# Patient Record
Sex: FEMALE | Race: WHITE | Employment: FULL TIME | URBAN - METROPOLITAN AREA
[De-identification: names, ages, dates, MRNs, and addresses within clinical notes are randomized per-mention and may not be internally consistent; named-entity substitution may affect disease eponyms.]

---

## 2017-03-04 ENCOUNTER — HOSPITAL ENCOUNTER (EMERGENCY)
Facility: HOSPITAL | Age: 57
Discharge: HOME | End: 2017-03-04
Attending: EMERGENCY MEDICINE
Payer: COMMERCIAL

## 2017-03-04 ENCOUNTER — APPOINTMENT (OUTPATIENT)
Dept: CT IMAGING | Facility: HOSPITAL | Age: 57
End: 2017-03-04
Attending: EMERGENCY MEDICINE
Payer: COMMERCIAL

## 2017-03-04 VITALS
RESPIRATION RATE: 16 BRPM | OXYGEN SATURATION: 98 % | SYSTOLIC BLOOD PRESSURE: 126 MMHG | HEIGHT: 64 IN | WEIGHT: 132 LBS | DIASTOLIC BLOOD PRESSURE: 69 MMHG | BODY MASS INDEX: 22.53 KG/M2 | HEART RATE: 76 BPM | TEMPERATURE: 97 F

## 2017-03-04 DIAGNOSIS — M54.50 BACK PAIN, LUMBOSACRAL: Primary | ICD-10-CM

## 2017-03-04 LAB
ALBUMIN SERPL-MCNC: 3.7 G/DL (ref 3.5–4.8)
ALP LIVER SERPL-CCNC: 82 U/L (ref 41–108)
ALT SERPL-CCNC: 17 U/L (ref 14–54)
AST SERPL-CCNC: 11 U/L (ref 15–41)
BASOPHILS # BLD AUTO: 0.04 X10(3) UL (ref 0–0.1)
BASOPHILS NFR BLD AUTO: 0.5 %
BILIRUB SERPL-MCNC: 0.4 MG/DL (ref 0.1–2)
BILIRUB UR QL STRIP.AUTO: NEGATIVE
BUN BLD-MCNC: 13 MG/DL (ref 8–20)
CALCIUM BLD-MCNC: 9.9 MG/DL (ref 8.3–10.3)
CHLORIDE: 106 MMOL/L (ref 101–111)
CLARITY UR REFRACT.AUTO: CLEAR
CO2: 28 MMOL/L (ref 22–32)
COLOR UR AUTO: YELLOW
CREAT BLD-MCNC: 0.74 MG/DL (ref 0.55–1.02)
EOSINOPHIL # BLD AUTO: 0.07 X10(3) UL (ref 0–0.3)
EOSINOPHIL NFR BLD AUTO: 0.8 %
ERYTHROCYTE [DISTWIDTH] IN BLOOD BY AUTOMATED COUNT: 11.2 % (ref 11.5–16)
GLUCOSE BLD-MCNC: 98 MG/DL (ref 70–99)
GLUCOSE UR STRIP.AUTO-MCNC: NEGATIVE MG/DL
HCT VFR BLD AUTO: 39.2 % (ref 34–50)
HGB BLD-MCNC: 13.4 G/DL (ref 12–16)
IMMATURE GRANULOCYTE COUNT: 0.02 X10(3) UL (ref 0–1)
IMMATURE GRANULOCYTE RATIO %: 0.2 %
KETONES UR STRIP.AUTO-MCNC: NEGATIVE MG/DL
LIPASE: 132 U/L (ref 73–393)
LYMPHOCYTES # BLD AUTO: 1.69 X10(3) UL (ref 0.9–4)
LYMPHOCYTES NFR BLD AUTO: 20.2 %
M PROTEIN MFR SERPL ELPH: 8.2 G/DL (ref 6.1–8.3)
MCH RBC QN AUTO: 30.2 PG (ref 27–33.2)
MCHC RBC AUTO-ENTMCNC: 34.2 G/DL (ref 31–37)
MCV RBC AUTO: 88.3 FL (ref 81–100)
MONOCYTES # BLD AUTO: 0.63 X10(3) UL (ref 0.1–0.6)
MONOCYTES NFR BLD AUTO: 7.5 %
NEUTROPHIL ABS PRELIM: 5.9 X10 (3) UL (ref 1.3–6.7)
NEUTROPHILS # BLD AUTO: 5.9 X10(3) UL (ref 1.3–6.7)
NEUTROPHILS NFR BLD AUTO: 70.8 %
NITRITE UR QL STRIP.AUTO: NEGATIVE
PH UR STRIP.AUTO: 7 [PH] (ref 4.5–8)
PLATELET # BLD AUTO: 340 10(3)UL (ref 150–450)
POTASSIUM SERPL-SCNC: 3.7 MMOL/L (ref 3.6–5.1)
PROT UR STRIP.AUTO-MCNC: NEGATIVE MG/DL
RBC # BLD AUTO: 4.44 X10(6)UL (ref 3.8–5.1)
RED CELL DISTRIBUTION WIDTH-SD: 35.8 FL (ref 35.1–46.3)
SODIUM SERPL-SCNC: 141 MMOL/L (ref 136–144)
SP GR UR STRIP.AUTO: 1.01 (ref 1–1.03)
UROBILINOGEN UR STRIP.AUTO-MCNC: <2 MG/DL
WBC # BLD AUTO: 8.4 X10(3) UL (ref 4–13)

## 2017-03-04 PROCEDURE — 81001 URINALYSIS AUTO W/SCOPE: CPT | Performed by: EMERGENCY MEDICINE

## 2017-03-04 PROCEDURE — 80053 COMPREHEN METABOLIC PANEL: CPT | Performed by: EMERGENCY MEDICINE

## 2017-03-04 PROCEDURE — 99284 EMERGENCY DEPT VISIT MOD MDM: CPT

## 2017-03-04 PROCEDURE — 85025 COMPLETE CBC W/AUTO DIFF WBC: CPT | Performed by: EMERGENCY MEDICINE

## 2017-03-04 PROCEDURE — 83690 ASSAY OF LIPASE: CPT | Performed by: EMERGENCY MEDICINE

## 2017-03-04 PROCEDURE — 87086 URINE CULTURE/COLONY COUNT: CPT | Performed by: EMERGENCY MEDICINE

## 2017-03-04 PROCEDURE — 74176 CT ABD & PELVIS W/O CONTRAST: CPT

## 2017-03-04 PROCEDURE — 36415 COLL VENOUS BLD VENIPUNCTURE: CPT

## 2017-03-04 RX ORDER — HYDROCODONE BITARTRATE AND ACETAMINOPHEN 5; 325 MG/1; MG/1
1-2 TABLET ORAL EVERY 4 HOURS PRN
Qty: 20 TABLET | Refills: 0 | Status: SHIPPED | OUTPATIENT
Start: 2017-03-04 | End: 2017-03-11

## 2017-03-04 NOTE — ED INITIAL ASSESSMENT (HPI)
Pt to ed from home with c/o pain to back and r side flank area, some constipation noted as well, sx present last 3 days.

## 2017-03-04 NOTE — ED PROVIDER NOTES
Patient Seen in: [facility] Emergency Department    History   Patient presents with:  Back Pain (musculoskeletal)    Stated Complaint: LOWER BACK PAIN    HPI    56-year-old white female who presents to the emergency room today for complaint of right daily. Fluticasone Propionate (FLONASE) 50 MCG/ACT Nasal Suspension,  2 sprays by Nasal route daily. ValACYclovir HCl (VALTREX) 1 G Oral Tab,  2 tabs now repeat in 12 hours  Patient taking differently: as needed.  2 tabs now repeat in 12 hours    Esomep Current:/69 mmHg  Pulse 76  Temp(Src) 97.4 °F (36.3 °C) (Temporal)  Resp 16  Ht 162.6 cm (5' 4\")  Wt 59.875 kg  BMI 22.65 kg/m2  SpO2 98%  LMP 01/21/2009        Physical Exam    Well-developed well-nourished female who is sitting on the gurney CBC W/ DIFFERENTIAL[[id number]]          Abnormal            Final result                 Please view results for these tests on the individual orders.    URINE CULTURE, ROUTINE    CT scan of the abdomen pelvis reveals:    FINDINGS:    KIDNEYS:  Nono Clinical Impression:  Back pain, lumbosacral  (primary encounter diagnosis)    Disposition:  Discharge    Follow-up:  Yossi Gracia, [address]  [phone]    In 2 days        Medications Prescribed:  [place]

## 2017-03-04 NOTE — ED NOTES
Round on pt. No distress noted. Pt updated on eta to CT. Denies any needs at this time.   Denies need for pain meds

## 2017-03-04 NOTE — ED NOTES
Pt present to ed from home with c/o r side back and flank pain, pt sx present off and on for 3 days, pt having some constipation, denies n/v.

## 2017-03-04 NOTE — ED NOTES
DC instructions and RX handed to pt. Pt denies any further needs at this time. No distress noted. Pt thanks staff for care. Family at bedside.

## 2017-07-31 PROCEDURE — 88175 CYTOPATH C/V AUTO FLUID REDO: CPT | Performed by: OBSTETRICS & GYNECOLOGY

## 2017-07-31 PROCEDURE — 87624 HPV HI-RISK TYP POOLED RSLT: CPT | Performed by: OBSTETRICS & GYNECOLOGY

## 2018-03-20 PROCEDURE — 88305 TISSUE EXAM BY PATHOLOGIST: CPT | Performed by: RADIOLOGY

## 2019-11-03 PROBLEM — M75.81 TENDINITIS OF RIGHT ROTATOR CUFF: Status: ACTIVE | Noted: 2019-11-03

## 2020-07-01 PROBLEM — H10.13 ALLERGIC CONJUNCTIVITIS OF BOTH EYES: Status: ACTIVE | Noted: 2019-09-16

## 2021-06-18 PROCEDURE — 88300 SURGICAL PATH GROSS: CPT | Performed by: UROLOGY

## 2021-06-19 ENCOUNTER — APPOINTMENT (OUTPATIENT)
Dept: GENERAL RADIOLOGY | Facility: HOSPITAL | Age: 61
DRG: 661 | End: 2021-06-19
Attending: UROLOGY
Payer: COMMERCIAL

## 2021-06-19 ENCOUNTER — APPOINTMENT (OUTPATIENT)
Dept: ULTRASOUND IMAGING | Facility: HOSPITAL | Age: 61
DRG: 661 | End: 2021-06-19
Attending: EMERGENCY MEDICINE
Payer: COMMERCIAL

## 2021-06-19 ENCOUNTER — HOSPITAL ENCOUNTER (INPATIENT)
Facility: HOSPITAL | Age: 61
LOS: 1 days | Discharge: HOME | DRG: 661 | End: 2021-06-20
Attending: EMERGENCY MEDICINE | Admitting: HOSPITALIST
Payer: COMMERCIAL

## 2021-06-19 ENCOUNTER — ANESTHESIA (OUTPATIENT)
Dept: SURGERY | Facility: HOSPITAL | Age: 61
DRG: 661 | End: 2021-06-19
Payer: COMMERCIAL

## 2021-06-19 ENCOUNTER — ANESTHESIA EVENT (OUTPATIENT)
Dept: SURGERY | Facility: HOSPITAL | Age: 61
DRG: 661 | End: 2021-06-19
Payer: COMMERCIAL

## 2021-06-19 DIAGNOSIS — N13.5 URETERAL STRICTURE, RIGHT: ICD-10-CM

## 2021-06-19 DIAGNOSIS — N13.30 HYDRONEPHROSIS, UNSPECIFIED HYDRONEPHROSIS TYPE: ICD-10-CM

## 2021-06-19 DIAGNOSIS — R10.9 ABDOMINAL PAIN, ACUTE: Primary | ICD-10-CM

## 2021-06-19 PROBLEM — R73.9 HYPERGLYCEMIA: Status: ACTIVE | Noted: 2021-06-19

## 2021-06-19 PROBLEM — R79.89 AZOTEMIA: Status: ACTIVE | Noted: 2021-06-19

## 2021-06-19 PROCEDURE — 80053 COMPREHEN METABOLIC PANEL: CPT | Performed by: EMERGENCY MEDICINE

## 2021-06-19 PROCEDURE — 0T778DZ DILATION OF LEFT URETER WITH INTRALUMINAL DEVICE, VIA NATURAL OR ARTIFICIAL OPENING ENDOSCOPIC: ICD-10-PCS | Performed by: UROLOGY

## 2021-06-19 PROCEDURE — 80074 ACUTE HEPATITIS PANEL: CPT | Performed by: HOSPITALIST

## 2021-06-19 PROCEDURE — BT1FZZZ FLUOROSCOPY OF LEFT KIDNEY, URETER AND BLADDER: ICD-10-PCS | Performed by: UROLOGY

## 2021-06-19 PROCEDURE — 76700 US EXAM ABDOM COMPLETE: CPT | Performed by: EMERGENCY MEDICINE

## 2021-06-19 PROCEDURE — 82365 CALCULUS SPECTROSCOPY: CPT | Performed by: UROLOGY

## 2021-06-19 PROCEDURE — 81003 URINALYSIS AUTO W/O SCOPE: CPT | Performed by: EMERGENCY MEDICINE

## 2021-06-19 PROCEDURE — 93005 ELECTROCARDIOGRAM TRACING: CPT

## 2021-06-19 PROCEDURE — 96376 TX/PRO/DX INJ SAME DRUG ADON: CPT

## 2021-06-19 PROCEDURE — 99285 EMERGENCY DEPT VISIT HI MDM: CPT

## 2021-06-19 PROCEDURE — 96361 HYDRATE IV INFUSION ADD-ON: CPT

## 2021-06-19 PROCEDURE — 93010 ELECTROCARDIOGRAM REPORT: CPT

## 2021-06-19 PROCEDURE — 96374 THER/PROPH/DIAG INJ IV PUSH: CPT

## 2021-06-19 PROCEDURE — 96375 TX/PRO/DX INJ NEW DRUG ADDON: CPT

## 2021-06-19 PROCEDURE — 0TC78ZZ EXTIRPATION OF MATTER FROM LEFT URETER, VIA NATURAL OR ARTIFICIAL OPENING ENDOSCOPIC: ICD-10-PCS | Performed by: UROLOGY

## 2021-06-19 PROCEDURE — 85025 COMPLETE CBC W/AUTO DIFF WBC: CPT | Performed by: EMERGENCY MEDICINE

## 2021-06-19 DEVICE — URETERAL STENT
Type: IMPLANTABLE DEVICE | Site: URETER | Status: FUNCTIONAL
Brand: ASCERTA™

## 2021-06-19 RX ORDER — HYDROMORPHONE HYDROCHLORIDE 1 MG/ML
0.4 INJECTION, SOLUTION INTRAMUSCULAR; INTRAVENOUS; SUBCUTANEOUS EVERY 5 MIN PRN
Status: DISCONTINUED | OUTPATIENT
Start: 2021-06-19 | End: 2021-06-19 | Stop reason: HOSPADM

## 2021-06-19 RX ORDER — ONDANSETRON 2 MG/ML
4 INJECTION INTRAMUSCULAR; INTRAVENOUS AS NEEDED
Status: DISCONTINUED | OUTPATIENT
Start: 2021-06-19 | End: 2021-06-19 | Stop reason: HOSPADM

## 2021-06-19 RX ORDER — ONDANSETRON 2 MG/ML
4 INJECTION INTRAMUSCULAR; INTRAVENOUS EVERY 4 HOURS PRN
Status: DISPENSED | OUTPATIENT
Start: 2021-06-19 | End: 2021-06-19

## 2021-06-19 RX ORDER — ONDANSETRON 2 MG/ML
INJECTION INTRAMUSCULAR; INTRAVENOUS AS NEEDED
Status: DISCONTINUED | OUTPATIENT
Start: 2021-06-19 | End: 2021-06-19 | Stop reason: SURG

## 2021-06-19 RX ORDER — MORPHINE SULFATE 4 MG/ML
4 INJECTION, SOLUTION INTRAMUSCULAR; INTRAVENOUS EVERY 2 HOUR PRN
Status: DISCONTINUED | OUTPATIENT
Start: 2021-06-19 | End: 2021-06-20

## 2021-06-19 RX ORDER — CEPHALEXIN 500 MG/1
500 CAPSULE ORAL 3 TIMES DAILY
Qty: 6 CAPSULE | Refills: 0 | Status: SHIPPED | OUTPATIENT
Start: 2021-06-19 | End: 2021-06-21

## 2021-06-19 RX ORDER — DIPHENHYDRAMINE HYDROCHLORIDE 50 MG/ML
12.5 INJECTION INTRAMUSCULAR; INTRAVENOUS AS NEEDED
Status: DISCONTINUED | OUTPATIENT
Start: 2021-06-19 | End: 2021-06-19 | Stop reason: HOSPADM

## 2021-06-19 RX ORDER — SODIUM CHLORIDE 9 MG/ML
INJECTION, SOLUTION INTRAVENOUS CONTINUOUS
Status: DISCONTINUED | OUTPATIENT
Start: 2021-06-19 | End: 2021-06-20

## 2021-06-19 RX ORDER — HYDROMORPHONE HYDROCHLORIDE 1 MG/ML
0.5 INJECTION, SOLUTION INTRAMUSCULAR; INTRAVENOUS; SUBCUTANEOUS EVERY 30 MIN PRN
Status: ACTIVE | OUTPATIENT
Start: 2021-06-19 | End: 2021-06-19

## 2021-06-19 RX ORDER — HYDROMORPHONE HYDROCHLORIDE 1 MG/ML
1 INJECTION, SOLUTION INTRAMUSCULAR; INTRAVENOUS; SUBCUTANEOUS ONCE
Status: COMPLETED | OUTPATIENT
Start: 2021-06-19 | End: 2021-06-19

## 2021-06-19 RX ORDER — CEFAZOLIN SODIUM 1 G/3ML
INJECTION, POWDER, FOR SOLUTION INTRAMUSCULAR; INTRAVENOUS AS NEEDED
Status: DISCONTINUED | OUTPATIENT
Start: 2021-06-19 | End: 2021-06-19 | Stop reason: SURG

## 2021-06-19 RX ORDER — ONDANSETRON 2 MG/ML
4 INJECTION INTRAMUSCULAR; INTRAVENOUS EVERY 6 HOURS PRN
Status: DISCONTINUED | OUTPATIENT
Start: 2021-06-19 | End: 2021-06-20

## 2021-06-19 RX ORDER — METOCLOPRAMIDE HYDROCHLORIDE 5 MG/ML
INJECTION INTRAMUSCULAR; INTRAVENOUS AS NEEDED
Status: DISCONTINUED | OUTPATIENT
Start: 2021-06-19 | End: 2021-06-19 | Stop reason: SURG

## 2021-06-19 RX ORDER — MORPHINE SULFATE 2 MG/ML
1 INJECTION, SOLUTION INTRAMUSCULAR; INTRAVENOUS EVERY 2 HOUR PRN
Status: DISCONTINUED | OUTPATIENT
Start: 2021-06-19 | End: 2021-06-20

## 2021-06-19 RX ORDER — SODIUM CHLORIDE, SODIUM LACTATE, POTASSIUM CHLORIDE, CALCIUM CHLORIDE 600; 310; 30; 20 MG/100ML; MG/100ML; MG/100ML; MG/100ML
INJECTION, SOLUTION INTRAVENOUS CONTINUOUS
Status: DISCONTINUED | OUTPATIENT
Start: 2021-06-19 | End: 2021-06-19 | Stop reason: HOSPADM

## 2021-06-19 RX ORDER — NALOXONE HYDROCHLORIDE 0.4 MG/ML
80 INJECTION, SOLUTION INTRAMUSCULAR; INTRAVENOUS; SUBCUTANEOUS AS NEEDED
Status: DISCONTINUED | OUTPATIENT
Start: 2021-06-19 | End: 2021-06-19 | Stop reason: HOSPADM

## 2021-06-19 RX ORDER — MEPERIDINE HYDROCHLORIDE 25 MG/ML
12.5 INJECTION INTRAMUSCULAR; INTRAVENOUS; SUBCUTANEOUS AS NEEDED
Status: DISCONTINUED | OUTPATIENT
Start: 2021-06-19 | End: 2021-06-19 | Stop reason: HOSPADM

## 2021-06-19 RX ORDER — HYDROMORPHONE HYDROCHLORIDE 1 MG/ML
INJECTION, SOLUTION INTRAMUSCULAR; INTRAVENOUS; SUBCUTANEOUS
Status: COMPLETED
Start: 2021-06-19 | End: 2021-06-19

## 2021-06-19 RX ORDER — SODIUM CHLORIDE 9 MG/ML
INJECTION, SOLUTION INTRAVENOUS CONTINUOUS
Status: ACTIVE | OUTPATIENT
Start: 2021-06-19 | End: 2021-06-19

## 2021-06-19 RX ORDER — KETOROLAC TROMETHAMINE 30 MG/ML
15 INJECTION, SOLUTION INTRAMUSCULAR; INTRAVENOUS ONCE
Status: COMPLETED | OUTPATIENT
Start: 2021-06-19 | End: 2021-06-19

## 2021-06-19 RX ORDER — LIDOCAINE HYDROCHLORIDE 10 MG/ML
INJECTION, SOLUTION EPIDURAL; INFILTRATION; INTRACAUDAL; PERINEURAL AS NEEDED
Status: DISCONTINUED | OUTPATIENT
Start: 2021-06-19 | End: 2021-06-19 | Stop reason: SURG

## 2021-06-19 RX ORDER — LABETALOL HYDROCHLORIDE 5 MG/ML
5 INJECTION, SOLUTION INTRAVENOUS EVERY 5 MIN PRN
Status: DISCONTINUED | OUTPATIENT
Start: 2021-06-19 | End: 2021-06-19 | Stop reason: HOSPADM

## 2021-06-19 RX ORDER — MORPHINE SULFATE 2 MG/ML
2 INJECTION, SOLUTION INTRAMUSCULAR; INTRAVENOUS EVERY 2 HOUR PRN
Status: DISCONTINUED | OUTPATIENT
Start: 2021-06-19 | End: 2021-06-20

## 2021-06-19 RX ORDER — METOCLOPRAMIDE HYDROCHLORIDE 5 MG/ML
10 INJECTION INTRAMUSCULAR; INTRAVENOUS AS NEEDED
Status: DISCONTINUED | OUTPATIENT
Start: 2021-06-19 | End: 2021-06-19 | Stop reason: HOSPADM

## 2021-06-19 RX ORDER — KETOROLAC TROMETHAMINE 15 MG/ML
15 INJECTION, SOLUTION INTRAMUSCULAR; INTRAVENOUS ONCE
Status: COMPLETED | OUTPATIENT
Start: 2021-06-19 | End: 2021-06-19

## 2021-06-19 RX ORDER — PROCHLORPERAZINE EDISYLATE 5 MG/ML
5 INJECTION INTRAMUSCULAR; INTRAVENOUS EVERY 8 HOURS PRN
Status: DISCONTINUED | OUTPATIENT
Start: 2021-06-19 | End: 2021-06-20

## 2021-06-19 RX ADMIN — SODIUM CHLORIDE: 9 INJECTION, SOLUTION INTRAVENOUS at 16:03:00

## 2021-06-19 RX ADMIN — CEFAZOLIN SODIUM 2 G: 1 INJECTION, POWDER, FOR SOLUTION INTRAMUSCULAR; INTRAVENOUS at 16:15:00

## 2021-06-19 RX ADMIN — METOCLOPRAMIDE HYDROCHLORIDE 10 MG: 5 INJECTION INTRAMUSCULAR; INTRAVENOUS at 16:09:00

## 2021-06-19 RX ADMIN — LIDOCAINE HYDROCHLORIDE 50 MG: 10 INJECTION, SOLUTION EPIDURAL; INFILTRATION; INTRACAUDAL; PERINEURAL at 16:09:00

## 2021-06-19 RX ADMIN — ONDANSETRON 4 MG: 2 INJECTION INTRAMUSCULAR; INTRAVENOUS at 16:22:00

## 2021-06-19 NOTE — PROGRESS NOTES
I spoke to the ER physician and the patient by phone. CT scan images suggest a UPJ obstruction. Looks like a benign process to me. I spoke with her on the phone about cystoscopy, right retrograde pyelogram, ureteroscopy and placement of ureteral stent.

## 2021-06-19 NOTE — ED INITIAL ASSESSMENT (HPI)
Patient here with c/o right flank pain. Patient was seen at Thursday and was told she has a upj obstruction. Patient reports feeling a little nauseated. Denies diarrhea and fever.

## 2021-06-19 NOTE — ANESTHESIA PROCEDURE NOTES
Airway  Date/Time: 6/19/2021 4:09 PM  Urgency: elective      General Information and Staff    Patient location during procedure: OR  Anesthesiologist: Chelsie Peng MD    Indications and Patient Condition  Indications for airway management: anesthesia

## 2021-06-19 NOTE — ED PROVIDER NOTES
Patient Seen in: [facility] Emergency Department      History   Patient presents with:  Abdomen/Flank Pain    Stated Complaint: right abdominal pain, seen at IC yesterday.  dx with upj obstruction     HPI/Subjective:   HPI    This is a 62-year-old fe colon appears normal. PELVIS: No mass is seen. MESENTERY/RETROPERITONEUM: There is no evidence of mesenteric, retroperitoneal or inguinal adenopathy. PERITONEUM: There is no free air or significant free fluid.  OSSEOUS STRUCTURES: The visualized osseous str Physical Exam  General: . Patient is in some moderate discomfort secondary to pain. There is no CVA tenderness the patient is mild to moderately tender in the right side of her abdomen.   Mild to moderately tender on the right side of the abdomen DRAW GOLD   RAPID SARS-COV-2 BY PCR   CBC W/ DIFFERENTIAL          The patient was placed on monitors, IV was started, blood was drawn. MDM            The EKG shows normal sinus rhythm. There is no acute ST elevations or ischemic findings.   The re performed from the lung bases through the pubic symphysis without the use of intravenous or oral contrast.  Note some limitations of the examination due to lack of intravenous contrast. Automated exposure control and ALARA manual techniques for patient spe patient started having increasing pain and discomfort patient had to give multiple doses of Dilaudid.   I did go back and reexamined and the pain is not reproducible on pressing the abdomen but does seem to be that is intermittent sporadic in nature because

## 2021-06-19 NOTE — H&P
[facility] Hospitalist H&P       CC: Patient presents with:  Abdomen/Flank Pain       PCP: Patti Robison DO    History of Present Illness: 63 y/o w hx of gerd, htn p/w c/o right sided flank pain, was diagnosed w right UPJ obstrcution on ct abd 6/17, ultrasound daily.  , Disp: , Rfl:   Fexofenadine HCl (ALLEGRA OR), as needed. , Disp: , Rfl:           Soc Hx  Social History    Tobacco Use      Smoking status: Never Smoker      Smokeless tobacco: Never Used    Alcohol use:  Yes      Alcohol/week: 0.0 standard drin focal deficit appreciated     Diagnostic Data:    CBC/Chem  Recent Labs   Lab 06/17/21  1638 06/19/21  0638   WBC 11.25 10.1   HGB 13.3 13.3   MCV 90.9 90.4    330.0       Recent Labs   Lab 06/17/21  1638 06/19/21  0638    138   K 3.89 3.6   C outlined    Thank Evon Brown MD    [facility] Hospitalist  Answering Service number: [phone]

## 2021-06-19 NOTE — OPERATIVE REPORT
OPERATIVE REPORT    [address] Patient Status:  Inpatient    1960 MRN [id number]   Location [address] [facility] Attending Callum Alcala MD   Hosp Day # 0 PCP Marleni Callahan DO       DATE OF OPERATION: 6 and the bladder was  examined. The ureteral orifices were in normal position. There was some fullness of the intramural ureter just proximal to the ureteral orifice.   A cone-tip catheter was passed into the right ureteral orifice and water-soluble contras

## 2021-06-19 NOTE — ANESTHESIA PREPROCEDURE EVALUATION
PRE-OP EVALUATION    Patient Name: Neetu Robbins    Admit Diagnosis: Abdominal pain, acute [R10.9]  Hydronephrosis, unspecified hydronephrosis type [N13.30]    Pre-op Diagnosis: Ureteral stricture, right [N13.5]    CYSTOSCOPY,  RETROGRADE , PYELOGRAM,  U (DIPRIVAN) injection, , Intravenous, PRN  lidocaine PF (XYLOCAINE) 1% injection, , Injection, PRN  fentaNYL citrate (SUBLIMAZE) 0.05 MG/ML injection, , Intravenous, PRN  Metoclopramide HCl (REGLAN) injection, , Intravenous, PRN  ondansetron HCl (ZOFRAN) in POSSIBLE BIOPSY, POSSIBLE POLYPECTOMY 17795;  Surgeon:  Tahir Warren MD;  Location: [address] [place] [address]   • D & C  04/17/14   • HYSTEROSCOPY  04/17/14   • LAPAROSCOPIC CHOLECYSTECTOMY N/A 12/26/2016    Procedure: Cesar Owens Other findings            ASA: 2   Plan: general  NPO status verified and patient meets guidelines. Patient has taken beta blockers in last 24 hours.         Plan/risks discussed with: patient            We discussed GA w/LMA or ETT and possible scratchy

## 2021-06-19 NOTE — PLAN OF CARE
Spoke to Dr. Tegan Small regarding possible discharge tonight per Urology. No discharge tonight, monitor pt overnight and check labs in am per Dr. Tegan Small.

## 2021-06-19 NOTE — PLAN OF CARE
Transport here to take pt to surgery. OR consent signed on chart. Anesthesia consent to be signed. Pre-op checklist completed. Pt has been NPO all day. Voided in bathroom.  Right sided abdominal pain with intermittent severity up to 8/10, moderately control

## 2021-06-19 NOTE — ANESTHESIA POSTPROCEDURE EVALUATION
[address] Patient Status:  Inpatient   Age/Gender 62year old female MRN [id number]   Location [address] [facility] Attending Johnson Meneses MD   [facility] Day # 0 PCP Amrit Hoffmann DO       Anesthesia Post-op Note

## 2021-06-19 NOTE — ANESTHESIA PROCEDURE NOTES
Peripheral IV  Date/Time: 6/19/2021 4:14 PM  Inserted by: Jennifer Stewart MD    Placement  Needle size: 18 G  Laterality: left  Location: hand  Site prep: alcohol  Technique: anatomical landmarks  Attempts: 1

## 2021-06-20 VITALS
HEIGHT: 64 IN | SYSTOLIC BLOOD PRESSURE: 119 MMHG | HEART RATE: 72 BPM | OXYGEN SATURATION: 97 % | DIASTOLIC BLOOD PRESSURE: 59 MMHG | RESPIRATION RATE: 14 BRPM | TEMPERATURE: 98 F | WEIGHT: 140 LBS | BODY MASS INDEX: 23.9 KG/M2

## 2021-06-20 PROCEDURE — 85027 COMPLETE CBC AUTOMATED: CPT | Performed by: UROLOGY

## 2021-06-20 PROCEDURE — 80053 COMPREHEN METABOLIC PANEL: CPT | Performed by: UROLOGY

## 2021-06-20 NOTE — PLAN OF CARE
Patient alert and oriented x4, vital signs stable on RA. Mild abdominal discomfort expressed early in evening, now resolves and no reports of pain. Patient reports burning with urination upon initiation of stream but then subsides.  Scant blood present when

## 2021-06-20 NOTE — DISCHARGE SUMMARY
General Medicine Discharge Summary     Patient ID:  Home Sethi  62year old  6/12/1960    Admit date: 6/19/2021    Discharge date and time: 6/20/21  Attending Physician: Mathew Mars MD     Consults: IP CONSULT TO UROLOGY  IP CONSULT TO HOSPITALIST the right ureteropelvic junction, suggesting right UPJ obstruction. No nephrolithiasis is seen. XR OR - N/C    Result Date: 6/20/2021  CONCLUSION:  Please see the procedure/operative report for further details.      Dictated by (CST): Jonathan Hayward, experience with your operation. These instructions will help to minimize pain and improve the likelihood of a successful result.     You Should Expect:  · Bloody urine until the stent is removed  · Burning with urination until the stent is removed    Care your home medications as instructed    Diet  · Resume your normal diet    Activity  Resume normal activity.     Driving  Do not drive for 24 hours    Follow-up Appointment with Dr. Irish Leung  · Call Dr. Greg Stewart office tomorrow for an appointment in 2 to 4 weeks f

## 2021-06-20 NOTE — PLAN OF CARE
Returned to room after PACU at 1800. Alert and oriented x4. VSS. Ambulated to bathroom and voided karlo urine with some blood. C/o burning with urination and mild abdomen discomfort. Declined pain medication. IVF infusing, will continue IVF overnight.  Skip

## 2021-06-20 NOTE — PLAN OF CARE
NURSING DISCHARGE NOTE    Discharged Home via Wheelchair. Accompanied by Spouse  Belongings Taken by patient/family. AVS printed and discussed, IV removed, reminded to  Rx at pharmacy. Pt ready to discharge home.

## 2021-06-20 NOTE — PLAN OF CARE
POD 1 Cystoscopy, Pt is AAOX4, VSS, room air, normal diet, no N/V noted, voiding freely to restroom, no new diana blood noted in urine, Pt doing well, ready for discharge home, all needs met, all safety measures in place, call light within reach, will CTM.

## 2021-06-21 NOTE — PAYOR COMM NOTE
--------------  ADMISSION REVIEW     Payor: JOSE BENJAMIN  Subscriber #:  [id number]  Authorization Number: [id number]       Admit date: 6/19/21  Admit time: 11:19 AM       Admitting Physician: Geno Marquez MD  Attending Physician:  No att. providers found  P were in place on the lower legs. The cystoscope was passed into the bladder and the bladder was  examined. The ureteral orifices were in normal position. There was some fullness of the intramural ureter just proximal to the ureteral orifice.   A cone-t 9:48 AM Date of Service: 6/19/2021  6:34 AM Status: Signed    : Tahir Solo MD (Physician)           Patient Seen in: [facility] Emergency Department      History   Patient presents with:  Abdomen/Flank Pain    Stated Complaint: right abdo surgically removed. BOWEL: The small bowel demonstrates normal caliber. The appendix has been surgically removed. The colon appears normal. PELVIS: No mass is seen.  MESENTERY/RETROPERITONEUM: There is no evidence of mesenteric, retroperitoneal or inguinal .  Patient is in some moderate discomfort secondary to pain. There is no CVA tenderness the patient is mild to moderately tender in the right side of her abdomen.   Mild to moderately tender on the right side of the abdomen  The patient is in no respirator PCR   CBC W/ DIFFERENTIAL          The patient was placed on monitors, IV was started, blood was drawn. MDM            The EKG shows normal sinus rhythm. There is no acute ST elevations or ischemic findings.   The rest of the EKG including rate rhyt the pubic symphysis without the use of intravenous or oral contrast.  Note some limitations of the examination due to lack of intravenous contrast. Automated exposure control and ALARA manual techniques for patient specific dose reduction were followed [facility] and discomfort patient had to give multiple doses of Dilaudid.   I did go back and reexamined and the pain is not reproducible on pressing the abdomen but does seem to be that is intermittent sporadic in nature because of the continued pain, the hydronephro motrin but did not help, thinks may have passed a kidney stone 5 years ago but unsure, no cp/sob    PMH  Past Medical History:   Diagnosis Date   • Arrhythmia     palpitations   • GERD EGD 2008   • HYPERTENSION    • Infertility female    • OSTEOPENIA    • obvious abnormality, atraumatic. Eyes:  Sclera anicteric, No conjunctival pallor, EOMs intact. Nose: Nares normal. Septum midline. Mucosa normal. No drainage.    Throat: Lips, mucosa, and tongue normal. Teeth and gums normal.   Neck: Supple, symmetrica obstruction. No nephrolithiasis is seen.          ASSESSMENT / PLAN:   63 y/o w hx of gerd, htn p/w c/o right sided flank pain, was diagnosed w right UPJ obstrcution on ct abd 6/17, ultrasound in er today showing mod to marked right hydro    Right upj obstr

## 2021-06-21 NOTE — PAYOR COMM NOTE
--------------  DISCHARGE REVIEW    Payor: JOSE BENJAMIN  Subscriber #:  [id number]  Authorization Number: [id number]       Admit date: 6/19/21  Admit time:  11:19 AM  Discharge Date: 6/20/2021  8:13 AM     Admitting Physician: Joslyn Agrawal MD  Attending Phys PYELOGRAM,  URETEROSCOPY, STONE MANIPULATION AND INSERTION RIGHT URETERAL STENT (Right)     Imaging: US ABDOMEN COMPLETE (CPT=76700)    Result Date: 6/19/2021  CONCLUSION:  Moderate to marked right hydronephrosis. Gallbladder surgically absent.   [address], [address] [address]    Phone: [phone]   · cephALEXin 500 MG Caps         FU  Follow-up Information     Schedule an appointment as soon as possible for a visit wi synthetic device placed in the body, there is a risk of infection. The stent may have to be removed if this happens.    Bathing/Showers  · You can resume showering tomorrow  · No baths, swimming, or hot tubs until you receive medical permission      Over-Th and agree with therapeutic plan as outlined    Thank Dayday Miller MD    [facility] Hospitalist  Pager

## 2021-07-01 NOTE — CONSULTS
Right flank pain. Imaging and labs reviewed. CT scan images suggest a UPJ obstruction. Looks like a benign process to me. Appears to be in discomfort.  Mild right flank tenderness I spoke with her and her  about cystoscopy, right retrograde pyelogra

## 2022-02-14 PROBLEM — I48.0 PAF (PAROXYSMAL ATRIAL FIBRILLATION) (HCC): Status: ACTIVE | Noted: 2022-02-14

## 2022-05-20 ENCOUNTER — ORDER TRANSCRIPTION (OUTPATIENT)
Dept: ADMINISTRATIVE | Facility: HOSPITAL | Age: 62
End: 2022-05-20

## 2022-05-20 DIAGNOSIS — Z13.6 SCREENING FOR CARDIOVASCULAR CONDITION: Primary | ICD-10-CM

## 2022-05-21 ENCOUNTER — HOSPITAL ENCOUNTER (OUTPATIENT)
Dept: CT IMAGING | Facility: HOSPITAL | Age: 62
Discharge: HOME | End: 2022-05-21
Attending: FAMILY MEDICINE

## 2022-05-21 DIAGNOSIS — Z13.6 SCREENING FOR CARDIOVASCULAR CONDITION: ICD-10-CM

## 2023-02-07 ENCOUNTER — HOSPITAL ENCOUNTER (OUTPATIENT)
Dept: ULTRASOUND IMAGING | Age: 63
Discharge: HOME | End: 2023-02-07
Attending: FAMILY MEDICINE

## 2023-02-07 DIAGNOSIS — Z13.9 ENCOUNTER FOR SCREENING: ICD-10-CM

## 2023-08-29 ENCOUNTER — OFFICE VISIT (OUTPATIENT)
Facility: LOCATION | Age: 63
End: 2023-08-29
Payer: COMMERCIAL

## 2023-08-29 DIAGNOSIS — H61.23 BILATERAL IMPACTED CERUMEN: Primary | ICD-10-CM

## 2023-08-29 PROCEDURE — 99213 OFFICE O/P EST LOW 20 MIN: CPT | Performed by: OTOLARYNGOLOGY

## 2023-11-09 ENCOUNTER — OFFICE VISIT (OUTPATIENT)
Dept: FAMILY MEDICINE CLINIC | Facility: CLINIC | Age: 63
End: 2023-11-09
Payer: COMMERCIAL

## 2023-11-09 VITALS
HEIGHT: 64 IN | OXYGEN SATURATION: 98 % | WEIGHT: 138 LBS | SYSTOLIC BLOOD PRESSURE: 138 MMHG | BODY MASS INDEX: 23.56 KG/M2 | DIASTOLIC BLOOD PRESSURE: 86 MMHG | RESPIRATION RATE: 16 BRPM | TEMPERATURE: 98 F | HEART RATE: 75 BPM

## 2023-11-09 DIAGNOSIS — R05.1 ACUTE COUGH: ICD-10-CM

## 2023-11-09 DIAGNOSIS — J02.9 SORE THROAT: ICD-10-CM

## 2023-11-09 DIAGNOSIS — J01.40 ACUTE NON-RECURRENT PANSINUSITIS: Primary | ICD-10-CM

## 2023-11-09 PROCEDURE — 3079F DIAST BP 80-89 MM HG: CPT

## 2023-11-09 PROCEDURE — 3008F BODY MASS INDEX DOCD: CPT

## 2023-11-09 PROCEDURE — 3075F SYST BP GE 130 - 139MM HG: CPT

## 2023-11-09 PROCEDURE — 99213 OFFICE O/P EST LOW 20 MIN: CPT

## 2023-11-09 RX ORDER — BENZONATATE 200 MG/1
200 CAPSULE ORAL 3 TIMES DAILY PRN
Qty: 21 CAPSULE | Refills: 0 | Status: SHIPPED | OUTPATIENT
Start: 2023-11-09 | End: 2023-11-16

## 2023-11-09 RX ORDER — AMOXICILLIN AND CLAVULANATE POTASSIUM 875; 125 MG/1; MG/1
1 TABLET, FILM COATED ORAL 2 TIMES DAILY
Qty: 20 TABLET | Refills: 0 | Status: SHIPPED | OUTPATIENT
Start: 2023-11-09 | End: 2023-11-19

## 2024-12-08 ENCOUNTER — HOSPITAL ENCOUNTER (EMERGENCY)
Age: 64
Discharge: HOME | End: 2024-12-08
Payer: COMMERCIAL

## 2024-12-08 VITALS
DIASTOLIC BLOOD PRESSURE: 78 MMHG | WEIGHT: 135 LBS | HEIGHT: 64 IN | RESPIRATION RATE: 16 BRPM | TEMPERATURE: 98 F | SYSTOLIC BLOOD PRESSURE: 152 MMHG | BODY MASS INDEX: 23.05 KG/M2 | OXYGEN SATURATION: 97 % | HEART RATE: 81 BPM

## 2024-12-08 DIAGNOSIS — S61.209A AVULSION OF SKIN OF FINGER, INITIAL ENCOUNTER: Primary | ICD-10-CM

## 2024-12-08 PROCEDURE — 99283 EMERGENCY DEPT VISIT LOW MDM: CPT

## 2024-12-09 NOTE — DISCHARGE INSTRUCTIONS
Rest and drink plenty of fluids.   Leave the dressing on for the next 24-48 hours.   After this you can remove the gelfoam.  You may need to soak this off.   Wash area with soap and water twice a day.   Apply triple antibiotic ointment twice a day for the next few days.   Keep a dressing on this while using the ointment.   After this you can leave it open to air to help with healing.   Watch for any increased, redness, pain, or drainage.   Follow up with your PCP in 1 week as needed.

## 2024-12-09 NOTE — ED PROVIDER NOTES
Patient Seen in: [facility] In [place]      History     Chief Complaint   Patient presents with    Laceration/Abrasion     Stated Complaint: laceration- cut tip of finger off    Subjective:   65 yo female presents to the emergency department with c/o laceration.  Patient was using a mandolin slicer and was not using the guard.  The food slipped and she cut off part of her right pinky finger.  She couldn't get the bleeding to stop so she came in.  She believes her tetanus shot is up to date.  She denies any other injuries, numbness, or tingling.     The history is provided by the patient.         Objective:     Past Medical History:    GERD    Infertility female    OSTEOPENIA    Visual impairment    glasses              Past Surgical History:   Procedure Laterality Date    Appendectomy  05/1990    Benign biopsy left  03/20/2018    12:00 4 cmfn    Colonoscopy  2009 ,03/2014    Colonoscopy N/A 12/29/2017    Procedure: COLONOSCOPY, POSSIBLE BIOPSY, POSSIBLE POLYPECTOMY 43358;  Surgeon: Samy Balderas MD;  [place] [facility] [place] [facility]    D & c  04/17/14    Hysteroscopy  04/17/14    Laparoscopic cholecystectomy N/A 12/26/2016    Procedure: LAPAROSCOPIC CHOLECYSTECTOMY;  Surgeon: Nik Najera MD;  Location:  MAIN OR    Other surgical history  2007    Lt hand CTR    Other surgical history  07/12/2021    Cysto/stent Dr. Burks    Skin surgery  3/31/2011    Excision / Left posterior shoulder / junctional nevus with focal atypica    Upper gi endoscopy - referral  2007    GERD                Social History     Socioeconomic History    Marital status:    Tobacco Use    Smoking status: Never    Smokeless tobacco: Never   Vaping Use    Vaping status: Never Used   Substance and Sexual Activity    Alcohol use: Yes     Alcohol/week: 0.0 standard drinks of alcohol     Comment: very occas    Drug use: No    Sexual activity: Yes     Partners: Male     Comment: menopausal                   Physical  Exam     ED Triage Vitals [12/08/24 2025]   /78   Pulse 88   Resp 16   Temp 98 °F (36.7 °C)   Temp src    SpO2 96 %   O2 Device None (Room air)       Current Vitals:   Vital Signs  BP: 152/78  Pulse: 88  Resp: 16  Temp: 98 °F (36.7 °C)    Oxygen Therapy  SpO2: 96 %  O2 Device: None (Room air)        Physical Exam  Vitals and nursing note reviewed.   Constitutional:       General: She is not in acute distress.     Appearance: Normal appearance. She is normal weight. She is not ill-appearing.   HENT:      Head: Normocephalic and atraumatic.      Nose: Nose normal.      Mouth/Throat:      Mouth: Mucous membranes are moist.      Pharynx: Oropharynx is clear.   Cardiovascular:      Rate and Rhythm: Normal rate and regular rhythm.      Pulses: Normal pulses.      Heart sounds: Normal heart sounds.   Pulmonary:      Effort: Pulmonary effort is normal. No respiratory distress.      Breath sounds: Normal breath sounds.   Genitourinary:     General: Normal vulva.      Rectum: Normal.   Musculoskeletal:         General: Normal range of motion.   Skin:     General: Skin is warm and dry.      Capillary Refill: Capillary refill takes less than 2 seconds.      Comments: Skin avulsion of the right 5th ventral digit.  Small amount of active bleeding.  ROM, motor strength and sensation intact.  Cap refill <2 sec.     Neurological:      General: No focal deficit present.      Mental Status: She is alert and oriented to person, place, and time.   Psychiatric:         Mood and Affect: Mood normal.         Behavior: Behavior normal.           ED Course   Labs Reviewed - No data to display            MDM        Medical Decision Making  64-year-old female with skin avulsion of the left fifth digit.  Patient's immunization records were reviewed and tetanus is up-to-date.  A sepsis and Gelfoam dressing ordered for patient.  No evidence of fracture or neurovascular compromise.  Bleeding controlled with pressure dressing.  Discussed with  patient wound care for home.  Encouraged to follow-up with her PCP or return as needed.    Risk  OTC drugs.        Disposition and Plan     Clinical Impression:  1. Avulsion of skin of finger, initial encounter         Disposition:  Discharge  12/8/2024 10:00 pm    Follow-up:  Katherine Cuba DO  [address] [place] [address]  [phone]    Follow up in 1 week(s)  As needed          Medications Prescribed:  Current Discharge Medication List              Supplementary Documentation:

## 2024-12-09 NOTE — ED QUICK NOTES
The pt has minimal bleeding noted when the dressing was removed. The arm was elevated with a decrease in bleeding.Gelfoam is at the bedside.

## 2025-06-28 ENCOUNTER — APPOINTMENT (OUTPATIENT)
Dept: GENERAL RADIOLOGY | Age: 65
End: 2025-06-28
Attending: EMERGENCY MEDICINE
Payer: COMMERCIAL

## 2025-06-28 ENCOUNTER — HOSPITAL ENCOUNTER (EMERGENCY)
Age: 65
Discharge: HOME | End: 2025-06-28
Attending: EMERGENCY MEDICINE
Payer: COMMERCIAL

## 2025-06-28 VITALS
WEIGHT: 145 LBS | HEIGHT: 64 IN | HEART RATE: 85 BPM | RESPIRATION RATE: 18 BRPM | OXYGEN SATURATION: 97 % | BODY MASS INDEX: 24.75 KG/M2 | DIASTOLIC BLOOD PRESSURE: 57 MMHG | SYSTOLIC BLOOD PRESSURE: 133 MMHG | TEMPERATURE: 98 F

## 2025-06-28 DIAGNOSIS — I48.92 UNCONTROLLED ATRIAL FLUTTER (HCC): Primary | ICD-10-CM

## 2025-06-28 LAB
ALBUMIN SERPL-MCNC: 4.6 G/DL (ref 3.2–4.8)
ALBUMIN/GLOB SERPL: 1.6 {RATIO} (ref 1–2)
ALP LIVER SERPL-CCNC: 90 U/L (ref 50–130)
ALT SERPL-CCNC: 22 U/L (ref 10–49)
ANION GAP SERPL CALC-SCNC: 8 MMOL/L (ref 0–18)
AST SERPL-CCNC: 19 U/L (ref ?–34)
ATRIAL RATE: 312 BPM
ATRIAL RATE: 86 BPM
BASOPHILS # BLD AUTO: 0.09 X10(3) UL (ref 0–0.2)
BASOPHILS NFR BLD AUTO: 0.9 %
BILIRUB SERPL-MCNC: 0.5 MG/DL (ref 0.2–1.1)
BUN BLD-MCNC: 17 MG/DL (ref 9–23)
CALCIUM BLD-MCNC: 10.2 MG/DL (ref 8.7–10.6)
CHLORIDE SERPL-SCNC: 107 MMOL/L (ref 98–112)
CO2 SERPL-SCNC: 28 MMOL/L (ref 21–32)
CREAT BLD-MCNC: 0.85 MG/DL (ref 0.55–1.02)
EGFRCR SERPLBLD CKD-EPI 2021: 76 ML/MIN/1.73M2 (ref 60–?)
EOSINOPHIL # BLD AUTO: 0.23 X10(3) UL (ref 0–0.7)
EOSINOPHIL NFR BLD AUTO: 2.2 %
ERYTHROCYTE [DISTWIDTH] IN BLOOD BY AUTOMATED COUNT: 11.3 %
GLOBULIN PLAS-MCNC: 2.9 G/DL (ref 2–3.5)
GLUCOSE BLD-MCNC: 113 MG/DL (ref 70–99)
HCT VFR BLD AUTO: 42.8 % (ref 35–48)
HGB BLD-MCNC: 14.7 G/DL (ref 12–16)
IMM GRANULOCYTES # BLD AUTO: 0.03 X10(3) UL (ref 0–1)
IMM GRANULOCYTES NFR BLD: 0.3 %
LYMPHOCYTES # BLD AUTO: 4.14 X10(3) UL (ref 1–4)
LYMPHOCYTES NFR BLD AUTO: 40.5 %
MCH RBC QN AUTO: 31 PG (ref 26–34)
MCHC RBC AUTO-ENTMCNC: 34.3 G/DL (ref 31–37)
MCV RBC AUTO: 90.3 FL (ref 80–100)
MONOCYTES # BLD AUTO: 1.08 X10(3) UL (ref 0.1–1)
MONOCYTES NFR BLD AUTO: 10.6 %
NEUTROPHILS # BLD AUTO: 4.66 X10 (3) UL (ref 1.5–7.7)
NEUTROPHILS # BLD AUTO: 4.66 X10(3) UL (ref 1.5–7.7)
NEUTROPHILS NFR BLD AUTO: 45.5 %
OSMOLALITY SERPL CALC.SUM OF ELEC: 298 MOSM/KG (ref 275–295)
P AXIS: 251 DEGREES
P AXIS: 43 DEGREES
P-R INTERVAL: 130 MS
PLATELET # BLD AUTO: 366 10(3)UL (ref 150–450)
POTASSIUM SERPL-SCNC: 3.9 MMOL/L (ref 3.5–5.1)
PROT SERPL-MCNC: 7.5 G/DL (ref 5.7–8.2)
Q-T INTERVAL: 310 MS
Q-T INTERVAL: 388 MS
QRS DURATION: 148 MS
QRS DURATION: 82 MS
QTC CALCULATION (BEZET): 464 MS
QTC CALCULATION (BEZET): 499 MS
R AXIS: -11 DEGREES
R AXIS: 12 DEGREES
RBC # BLD AUTO: 4.74 X10(6)UL (ref 3.8–5.3)
SODIUM SERPL-SCNC: 143 MMOL/L (ref 136–145)
T AXIS: -51 DEGREES
T AXIS: 31 DEGREES
TROPONIN I SERPL HS-MCNC: 12 NG/L (ref ?–34)
VENTRICULAR RATE: 156 BPM
VENTRICULAR RATE: 86 BPM
WBC # BLD AUTO: 10.2 X10(3) UL (ref 4–11)

## 2025-06-28 PROCEDURE — 96375 TX/PRO/DX INJ NEW DRUG ADDON: CPT

## 2025-06-28 PROCEDURE — 99291 CRITICAL CARE FIRST HOUR: CPT

## 2025-06-28 PROCEDURE — 96368 THER/DIAG CONCURRENT INF: CPT

## 2025-06-28 PROCEDURE — 84484 ASSAY OF TROPONIN QUANT: CPT | Performed by: EMERGENCY MEDICINE

## 2025-06-28 PROCEDURE — 71045 X-RAY EXAM CHEST 1 VIEW: CPT | Performed by: EMERGENCY MEDICINE

## 2025-06-28 PROCEDURE — 85025 COMPLETE CBC W/AUTO DIFF WBC: CPT | Performed by: EMERGENCY MEDICINE

## 2025-06-28 PROCEDURE — 93005 ELECTROCARDIOGRAM TRACING: CPT

## 2025-06-28 PROCEDURE — 85730 THROMBOPLASTIN TIME PARTIAL: CPT | Performed by: EMERGENCY MEDICINE

## 2025-06-28 PROCEDURE — 80053 COMPREHEN METABOLIC PANEL: CPT | Performed by: EMERGENCY MEDICINE

## 2025-06-28 PROCEDURE — 96365 THER/PROPH/DIAG IV INF INIT: CPT

## 2025-06-28 PROCEDURE — 93010 ELECTROCARDIOGRAM REPORT: CPT

## 2025-06-28 RX ORDER — HEPARIN SODIUM AND DEXTROSE 10000; 5 [USP'U]/100ML; G/100ML
INJECTION INTRAVENOUS CONTINUOUS
Status: DISCONTINUED | OUTPATIENT
Start: 2025-06-28 | End: 2025-06-28

## 2025-06-28 RX ORDER — DILTIAZEM HYDROCHLORIDE 100 MG/1
INJECTION, POWDER, LYOPHILIZED, FOR SOLUTION INTRAVENOUS
Status: DISCONTINUED
Start: 2025-06-28 | End: 2025-06-28

## 2025-06-28 RX ORDER — HEPARIN SODIUM AND DEXTROSE 10000; 5 [USP'U]/100ML; G/100ML
12 INJECTION INTRAVENOUS ONCE
Status: COMPLETED | OUTPATIENT
Start: 2025-06-28 | End: 2025-06-28

## 2025-06-28 RX ORDER — HEPARIN SODIUM 1000 [USP'U]/ML
60 INJECTION, SOLUTION INTRAVENOUS; SUBCUTANEOUS ONCE
Status: COMPLETED | OUTPATIENT
Start: 2025-06-28 | End: 2025-06-28

## 2025-06-28 RX ORDER — METOPROLOL TARTRATE 1 MG/ML
5 INJECTION, SOLUTION INTRAVENOUS ONCE
Status: COMPLETED | OUTPATIENT
Start: 2025-06-28 | End: 2025-06-28

## 2025-06-28 RX ORDER — ADENOSINE 3 MG/ML
INJECTION, SOLUTION INTRAVENOUS
Status: COMPLETED
Start: 2025-06-28 | End: 2025-06-28

## 2025-06-28 NOTE — ED PROVIDER NOTES
Patient Seen in: [facility] In [place]        History  Chief Complaint   Patient presents with    Arrythmia/Palpitations     Stated Complaint: c/o of having Afib belives her heart rate is 163 @0200 denies chest pain    Subjective:   HPI            Patient is a 65-year-old female history of atrial fibrillation presents to ED for evaluation of palpitations.  Symptoms started around 2 AM.  Fisher like her heart was racing.  Denies chest pain, shortness of breath, weakness, dizziness or lightheadedness.  Last episode of atrial fibrillation about 2 years ago.  She is not on a blood thinner.  Denies coronary disease.  Denies smoking or drinking.  States she had an episode of palpitations couple nights ago but that was short duration only lasted for couple minutes      Objective:     Past Medical History:    Atrial fibrillation (HCC)    GERD    Infertility female    OSTEOPENIA    Visual impairment    glasses              Past Surgical History:   Procedure Laterality Date    Appendectomy  05/1990    Benign biopsy left  03/20/2018    12:00 4 cmfn    Colonoscopy  2009 ,03/2014    Colonoscopy N/A 12/29/2017    Procedure: COLONOSCOPY, POSSIBLE BIOPSY, POSSIBLE POLYPECTOMY 87227;  Surgeon: Samy Balderas MD;  Location: [facility]    D & c  04/17/14    Hysteroscopy  04/17/14    Laparoscopic cholecystectomy N/A 12/26/2016    Procedure: LAPAROSCOPIC CHOLECYSTECTOMY;  Surgeon: Nik Najera MD;  Location:  MAIN OR    Other surgical history  2007    Lt hand CTR    Other surgical history  07/12/2021    Cysto/stent Dr. Burks    Skin surgery  3/31/2011    Excision / Left posterior shoulder / junctional nevus with focal atypica    Upper gi endoscopy - referral  2007    GERD                Social History     Socioeconomic History    Marital status:    Tobacco Use    Smoking status: Never     Passive exposure: Never    Smokeless tobacco: Never   Vaping Use    Vaping status: Never Used    Substance and Sexual Activity    Alcohol use: Not Currently     Comment: very occas    Drug use: No    Sexual activity: Yes     Partners: Male     Comment: menopausal                                 Physical Exam    ED Triage Vitals [06/28/25 0300]   BP (!) 155/92   Pulse (!) 158   Resp 20   Temp 97.8 °F (36.6 °C)   Temp src Oral   SpO2 98 %   O2 Device None (Room air)       Current Vitals:   Vital Signs  BP: 133/57  Pulse: 85  Resp: 18  Temp: 97.8 °F (36.6 °C)  Temp src: Oral    Oxygen Therapy  SpO2: 97 %  O2 Device: None (Room air)            Physical Exam  GENERAL: No acute distress, well appearing and non-toxic, Alert and oriented X 3   HEENT: Normocephalic, atraumatic.  Moist mucous membranes.  Pupils equal round reactive to light and accommodation, extraocular motion is intact, sclerae white, conjunctiva is pink.  Oropharynx is unremarkable, no exudate.  NECK: Supple, trachea midline, no lymphadenopathy.   LUNG: Lungs clear to auscultation bilaterally, no wheezing, no rales, no rhonchi.  CARDIOVASCULAR: Tachycardic.  Regular rate and rhythm.  Normal S1S2.  No S3S4 or murmur.  ABDOMEN: Bowel sounds are present. Soft. nondistended, no pulsatile masses. nontender  MUSCULOSKELETAL: No calf tenderness.  Dorsalis and Posterior Tibial pulses present. No clubbing. No cyanosis.  No edema.   SKIN EXAMINATIoN: Warm and dry with normal appearance.  No rashes or lesions.  NEUROLOGICAL:  Motor strength intact all groups.  normal sensation, speech intact        ED Course  Labs Reviewed   COMP METABOLIC PANEL (14) - Abnormal; Notable for the following components:       Result Value    Glucose 113 (*)     Calculated Osmolality 298 (*)     All other components within normal limits   CBC WITH DIFFERENTIAL WITH PLATELET - Abnormal; Notable for the following components:    Lymphocyte Absolute 4.14 (*)     Monocyte Absolute 1.08 (*)     All other components within normal limits   TROPONIN I HIGH SENSITIVITY - Normal   PTT,  ACTIVATED - Normal   SCAN SLIDE   RAINBOW DRAW LAVENDER   RAINBOW DRAW LIGHT GREEN   RAINBOW DRAW BLUE     EKG    Rate, intervals and axes as noted on EKG Report.  Rate: 156  Rhythm: Atrial flutter  Reading: Patient has some likely rate dependent ST depression V3 through V6.         EKG #2 EKG  Rate, Axis and intervals as noted.  I agree with computer interpretation.  Rate: 86  Rhythm: Normal sinus  Reading: No acute change     I personally reviewed xray films of chest and independent interpretation shows no evidence for pneumonia.  I also reviewed formal xray report as read by radiology with findings below:    Xray of chest read by [facility] rad radiology shows no evidence for acute process         Medications   dilTIAZem 10 mg BOLUS FROM BAG infusion (10 mg Intravenous Bolus from Bag 6/28/25 0326)   dilTIAZem (cardIZEM) 100 mg in sodium chloride 0.9% 100 mL IVPB-ADDV (0 mg/hr Intravenous Stopped 6/28/25 0449)   dilTIAZem (cardIZEM) 100 MG injection (  Not Given 6/28/25 0308)   heparin (Porcine) 70322 units/250mL infusion ACS/AFIB CONTINUOUS (has no administration in time range)   metoprolol (Lopressor) 5 mg/5mL injection 5 mg (5 mg Intravenous Given 6/28/25 0343)   adenosine (Adenocard) 6 mg/2mL injection (6 mg  Given 6/28/25 0339)   heparin (Porcine) 1000 UNIT/ML injection - BOLUS IV 3,900 Units (3,900 Units Intravenous Given 6/28/25 0413)   heparin (Porcine) 47787 units/250mL infusion ED INITIAL DOSE (0 Units/kg/hr × 65.8 kg Intravenous Stopped 6/28/25 0449)                  MDM     Patient is a 65-year-old female presents to ED for evaluation of palpitations.  Differential SVT, atrial flutter, atrial fibrillation.  EKG showing atrial flutter 2-1 conduction.  Patient underwent laboratory testing which was unremarkable.  Chest x-ray showed no acute process.  Patient was given Cardizem 10 mg bolus x 2 and started on Cardizem drip.  Heart rate did not improve still staying in the 150s.  She was given adenosine 6 mg to  confirm atrial flutter and it did slow her down and confirmed atrial flutter waves were seen on the monitor.  She was given Lopressor 5 mg IV.  Heart rate improved to the 90s.  She was started on heparin given elevated [id number] 2 score of 2.  I placed a bed for inpatient admission and when going to speak with the patient I looked at the monitor and she was in normal sinus rhythm.  Repeat EKG was obtained showing normal sinus rhythm without acute changes.  I believe patient can go home since she converted to normal sinus rhythm.  She will be started on Eliquis 5 mg twice daily for stroke prophylaxis.  She should continue metoprolol.  Recommend follow-up with her cardiologist in 5-7 days.  Return to ED if further palpitations.  Critical care time was 60 minutes. This critical care time is exclusive of procedures critical care time includes monitoring of patient's cardiopulmonary and hemodynamic status, interpretation of laboratory values, and discussion of case with physician and consultants.    Admission disposition: 6/28/2025  4:17 AM           Medical Decision Making      Disposition and Plan     Clinical Impression:  1. Uncontrolled atrial flutter (HCC)         Disposition:  Admit  6/28/2025  4:17 am    Follow-up:  Bhavin Yao MD  [address] [facility]  [address]  [facility] [address]  [phone]    Follow up in 5 day(s)            Medications Prescribed:  Current Discharge Medication List        START taking these medications    Details   apixaban 5 MG Oral Tab Take 1 tablet (5 mg total) by mouth 2 (two) times daily.  Qty: 60 tablet, Refills: 0                   Supplementary Documentation:         Hospital Problems       Present on Admission  Date Reviewed: 11/9/2023          ICD-10-CM Noted POA    * (Principal) Uncontrolled atrial flutter (HCC) I48.92 6/28/2025 Unknown

## (undated) DEVICE — SCD SLEEVE KNEE HI BLEND

## (undated) DEVICE — CYSTO CDS-LF: Brand: MEDLINE INDUSTRIES, INC.

## (undated) DEVICE — ZIPWIRE GUIDEWIRE .038X150 STR

## (undated) DEVICE — NITINOL STONE RETRIEVAL BASKET: Brand: ZERO TIP

## (undated) DEVICE — SOL  .9 3000ML

## (undated) DEVICE — 5F CONE TIP CATHETER 8F TIP

## (undated) DEVICE — STERILE POLYISOPRENE POWDER-FREE SURGICAL GLOVES: Brand: PROTEXIS

## (undated) DEVICE — SEAL Y BIOPSY PORT P6R SCOPE

## (undated) DEVICE — HYDROGEL COATED URETERAL DILATOR: Brand: NOTTINGHAM ONE-STEP

## (undated) DEVICE — Device

## (undated) DEVICE — TIGERTAIL 5F FLXTIP 70CM

## (undated) NOTE — ED AVS SNAPSHOT
[facility] Emergency Department    [facility]  [address] [id number]    Phone:  [phone]    Fax:  [address] [facility] Kodak Singer   MRN: [id number]    Department:  [facility] Emergency Department   Date of Visit:  3/4/ IF THERE IS ANY CHANGE OR WORSENING OF YOUR CONDITION, CALL YOUR PRIMARY CARE PHYSICIAN AT ONCE OR RETURN IMMEDIATELY TO THE EMERGENCY DEPARTMENT.     If you have been prescribed any medication(s), please fill your prescription right away and begin taking t

## (undated) NOTE — ED AVS SNAPSHOT
[facility] Emergency Department    [place]  [address] [address]    Phone:  [phone]    Fax:  [address] [facility] Marcellus Thao   MRN: [id number]    Department:  [facility] Emergency Department   Date of Visit:  3/4/ - HYDROcodone-acetaminophen 5-325 MG Tabs              Discharge Instructions       Take Tylenol Advil for pain. Take Norco for breakthrough pain. Follow-up with your PCP this week. Return if pains worsen or new symptoms develop.     Discharge Reference [facility] Emergency Department. Follow-up care is at the discretion of that Physician. IF THERE IS ANY CHANGE OR WORSENING OF YOUR CONDITION, CALL YOUR PRIMARY CARE PHYSICIAN AT ONCE OR RETURN IMMEDIATELY TO THE EMERGENCY DEPARTMENT.     If you hav harming yourself, contact [address] [facility] at [phone]. - If you don’t have insurance, Padmini Calderon has partnered with Patient [address] to help you get signed up for insurance coverage.   Patient [place] BILIARY:  Sequelae of cholecystectomy is noted. PANCREAS:  Normal.  No lesion, fluid collection, ductal dilatation, or atrophy. SPLEEN:  Normal.  No enlargement or focal lesion. AORTA/VASCULAR:  Normal.  No aneurysm.     RETROPERITONEUM:  Normal.  No

## (undated) NOTE — LETTER
Jennifer Alexis [address]  [address] [facility] [address] [facility]  Authorization for Surgical Operation and Procedure     Date:___________                                                                                                         Time:__________ products. The following are some, but not all, of the potential risks that can occur: fever and allergic reactions, hemolytic reactions, transmission of diseases such as Hepatitis, AIDS and Cytomegalovirus (CMV) and fluid overload.   In the event that I wi on my behalf). The surgeon or my attending physician will determine when the applicable recovery period ends for purposes of reinstating the DNAR order.   10. Patients having a sterilization procedure: I understand that if the procedure is successful the re services, I agree to:  a. Allow the anesthesiologist (anesthesia doctor) to give me medicine and do additional procedures as necessary.  Some examples are: Starting or using an “IV” to give me medicine, fluids or blood during my procedure, and having a [facility] Anesthesia (“spinal”, “epidural”, & “nerve blocks”): I understand that rare but potential complications include headache, bleeding, infection, seizure, irregular heart rhythms, and nerve injury.     I can change my mind about having anesthesia ser